# Patient Record
Sex: MALE | Race: WHITE
[De-identification: names, ages, dates, MRNs, and addresses within clinical notes are randomized per-mention and may not be internally consistent; named-entity substitution may affect disease eponyms.]

---

## 2019-04-27 ENCOUNTER — HOSPITAL ENCOUNTER (EMERGENCY)
Dept: HOSPITAL 92 - ERS | Age: 59
Discharge: HOME | End: 2019-04-27
Payer: COMMERCIAL

## 2019-04-27 DIAGNOSIS — Z79.84: ICD-10-CM

## 2019-04-27 DIAGNOSIS — S16.1XXA: Primary | ICD-10-CM
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
## 2019-04-27 DIAGNOSIS — E78.5: ICD-10-CM

## 2019-04-27 DIAGNOSIS — Z79.899: ICD-10-CM

## 2019-04-27 DIAGNOSIS — M62.838: ICD-10-CM

## 2019-04-27 DIAGNOSIS — I10: ICD-10-CM

## 2019-04-27 DIAGNOSIS — E11.9: ICD-10-CM

## 2019-04-27 PROCEDURE — 99283 EMERGENCY DEPT VISIT LOW MDM: CPT

## 2019-12-03 ENCOUNTER — HOSPITAL ENCOUNTER (EMERGENCY)
Dept: HOSPITAL 92 - ERS | Age: 59
Discharge: HOME | End: 2019-12-03
Payer: SELF-PAY

## 2019-12-03 DIAGNOSIS — Z79.899: ICD-10-CM
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
## 2019-12-03 DIAGNOSIS — I10: ICD-10-CM

## 2019-12-03 DIAGNOSIS — E78.5: ICD-10-CM

## 2019-12-03 DIAGNOSIS — R73.03: ICD-10-CM

## 2019-12-03 DIAGNOSIS — S80.01XA: Primary | ICD-10-CM

## 2019-12-03 DIAGNOSIS — F17.210: ICD-10-CM

## 2019-12-03 DIAGNOSIS — E78.00: ICD-10-CM

## 2019-12-03 NOTE — RAD
XR Knee Rt 4 View STANDARD



HISTORY: Injury, right knee pain



FINDINGS:

No fracture or dislocation is identified. 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
resent



Reported By: James Silva 

Electronically Signed:  12/3/2019 1:10 PM

## 2023-05-04 ENCOUNTER — HOSPITAL ENCOUNTER (OUTPATIENT)
Dept: HOSPITAL 92 - CSHCT | Age: 63
Discharge: HOME | End: 2023-05-04
Attending: FAMILY MEDICINE
Payer: COMMERCIAL

## 2023-05-04 DIAGNOSIS — N50.812: ICD-10-CM

## 2023-05-04 DIAGNOSIS — K76.0: ICD-10-CM

## 2023-05-04 DIAGNOSIS — Z12.2: Primary | ICD-10-CM

## 2023-05-04 DIAGNOSIS — F17.210: ICD-10-CM

## 2023-05-04 PROCEDURE — 71271 CT THORAX LUNG CANCER SCR C-: CPT

## 2023-05-04 PROCEDURE — 76870 US EXAM SCROTUM: CPT
